# Patient Record
Sex: FEMALE | Race: WHITE | NOT HISPANIC OR LATINO | RURAL
[De-identification: names, ages, dates, MRNs, and addresses within clinical notes are randomized per-mention and may not be internally consistent; named-entity substitution may affect disease eponyms.]

---

## 2022-12-06 ENCOUNTER — WEB ENCOUNTER (OUTPATIENT)
Dept: RURAL CLINIC 4 | Facility: CLINIC | Age: 77
End: 2022-12-06

## 2022-12-06 ENCOUNTER — LAB OUTSIDE AN ENCOUNTER (OUTPATIENT)
Dept: RURAL CLINIC 4 | Facility: CLINIC | Age: 77
End: 2022-12-06

## 2022-12-06 ENCOUNTER — DASHBOARD ENCOUNTERS (OUTPATIENT)
Age: 77
End: 2022-12-06

## 2022-12-06 ENCOUNTER — OFFICE VISIT (OUTPATIENT)
Dept: RURAL CLINIC 4 | Facility: CLINIC | Age: 77
End: 2022-12-06
Payer: MEDICARE

## 2022-12-06 VITALS
SYSTOLIC BLOOD PRESSURE: 123 MMHG | HEIGHT: 61 IN | TEMPERATURE: 96.6 F | BODY MASS INDEX: 28.7 KG/M2 | DIASTOLIC BLOOD PRESSURE: 81 MMHG | HEART RATE: 80 BPM | WEIGHT: 152 LBS

## 2022-12-06 DIAGNOSIS — R10.13 EPIGASTRIC PAIN: ICD-10-CM

## 2022-12-06 DIAGNOSIS — Z86.010 HISTORY OF COLON POLYPS: ICD-10-CM

## 2022-12-06 DIAGNOSIS — R14.0 ABDOMINAL BLOATING: ICD-10-CM

## 2022-12-06 DIAGNOSIS — K21.9 ACID REFLUX: ICD-10-CM

## 2022-12-06 PROBLEM — 312824007: Status: ACTIVE | Noted: 2022-12-06

## 2022-12-06 PROBLEM — 422587007: Status: ACTIVE | Noted: 2022-12-06

## 2022-12-06 PROBLEM — 79922009: Status: ACTIVE | Noted: 2022-12-06

## 2022-12-06 PROBLEM — 116289008: Status: ACTIVE | Noted: 2022-12-06

## 2022-12-06 PROBLEM — 16932000: Status: ACTIVE | Noted: 2022-12-06

## 2022-12-06 PROCEDURE — 99203 OFFICE O/P NEW LOW 30 MIN: CPT | Performed by: NURSE PRACTITIONER

## 2022-12-06 RX ORDER — PANTOPRAZOLE SODIUM 40 MG/1
1 TABLET TABLET, DELAYED RELEASE ORAL TWICE A DAY
Status: ACTIVE | COMMUNITY

## 2022-12-06 RX ORDER — SODIUM, POTASSIUM,MAG SULFATES 17.5-3.13G
177ML SOLUTION, RECONSTITUTED, ORAL ORAL
Qty: 1 | Refills: 0 | OUTPATIENT
Start: 2022-12-06 | End: 2022-12-08

## 2022-12-06 RX ORDER — LEVOTHYROXINE SODIUM 100 UG/1
1 TABLET IN THE MORNING ON AN EMPTY STOMACH TABLET ORAL ONCE A DAY
Status: DISCONTINUED | COMMUNITY

## 2022-12-06 RX ORDER — BUDESONIDE AND FORMOTEROL FUMARATE DIHYDRATE 160; 4.5 UG/1; UG/1
2 PUFFS AEROSOL RESPIRATORY (INHALATION) TWICE A DAY
Status: ACTIVE | COMMUNITY

## 2022-12-06 RX ORDER — OMEPRAZOLE 20 MG/1
1 CAPSULE 30 MINUTES BEFORE MORNING MEAL CAPSULE, DELAYED RELEASE ORAL ONCE A DAY
Status: DISCONTINUED | COMMUNITY

## 2022-12-06 RX ORDER — HYDROCHLOROTHIAZIDE 25 MG/1
1 TABLET IN THE MORNING TABLET ORAL ONCE A DAY
Status: DISCONTINUED | COMMUNITY

## 2022-12-06 RX ORDER — NAPROXEN SODIUM 220 MG/1
1 TABLET WITH FOOD OR MILK AS NEEDED TABLET ORAL
Status: DISCONTINUED | COMMUNITY

## 2022-12-06 RX ORDER — TRICON 15; 110; 75; .5 UG/1; MG/1; MG/1; MG/1
1 CAPSULE WITH MEALS CAPSULE ORAL TWICE A DAY
Status: ACTIVE | COMMUNITY

## 2022-12-06 RX ORDER — TIOTROPIUM BROMIDE INHALATION SPRAY 3.12 UG/1
2 PUFFS SPRAY, METERED RESPIRATORY (INHALATION) ONCE A DAY
Status: ACTIVE | COMMUNITY

## 2022-12-06 RX ORDER — OCTISALATE, AVOBENZONE, HOMOSALATE, AND OCTOCRYLENE 29.4; 29.4; 49; 25.48 MG/ML; MG/ML; MG/ML; MG/ML
AS DIRECTED LOTION TOPICAL
Status: ACTIVE | COMMUNITY

## 2022-12-06 RX ORDER — ALBUTEROL SULFATE 2.5 MG/3ML
3 ML AS NEEDED SOLUTION RESPIRATORY (INHALATION)
Status: ACTIVE | COMMUNITY

## 2022-12-06 RX ORDER — LEVOTHYROXINE SODIUM 100 UG/1
1 TABLET IN THE MORNING ON AN EMPTY STOMACH TABLET ORAL ONCE A DAY
Status: ACTIVE | COMMUNITY

## 2022-12-06 RX ORDER — DULAGLUTIDE 3 MG/.5ML
AS DIRECTED INJECTION, SOLUTION SUBCUTANEOUS
Status: ACTIVE | COMMUNITY

## 2022-12-06 RX ORDER — ALPRAZOLAM 0.5 MG/1
1 TABLET TABLET ORAL TWICE A DAY
Status: ACTIVE | COMMUNITY

## 2022-12-06 RX ORDER — INSULIN GLARGINE 100 [IU]/ML
AS DIRECTED INJECTION, SOLUTION SUBCUTANEOUS
Status: ACTIVE | COMMUNITY

## 2022-12-06 RX ORDER — DULOXETINE 60 MG/1
1 CAPSULE CAPSULE, DELAYED RELEASE PELLETS ORAL ONCE A DAY
Status: ACTIVE | COMMUNITY

## 2022-12-06 RX ORDER — ROPINIROLE HYDROCHLORIDE 0.5 MG/1
1 TABLET TABLET, FILM COATED ORAL THREE TIMES A DAY
Status: DISCONTINUED | COMMUNITY

## 2022-12-06 RX ORDER — FENOFIBRATE 54 MG/1
1 TABLET WITH FOOD TABLET ORAL ONCE A DAY
Status: DISCONTINUED | COMMUNITY

## 2022-12-06 NOTE — HPI-TODAY'S VISIT:
The patient is a 77-year-old female who presents on referral from Radhika MAJOR for the evaluation of a colonoscopy for history of colon polyps and a family history of colon cancer in her father.  A copy of the consultation will be sent to the referring provider.  The patient last underwent a colonoscopy approximately 6 years ago.  She is unsure of her findings.  She is currently asymptomatic and denies abdominal pain, change in bowel pattern, melena and hematochezia. History of GERD and is currently taking pantoprazole 40 mg daily which typically controls her symptoms however over the past several weeks she has been having breakthrough acid reflux as well as nausea with rare vomiting.  She also reports a history of epigastric tenderness with upper abdominal bloating, mild in severity, occurring intermittently.

## 2023-03-10 PROBLEM — 428283002: Status: ACTIVE | Noted: 2022-12-06

## 2023-03-10 PROBLEM — 235595009: Status: ACTIVE | Noted: 2022-12-06

## 2023-04-14 ENCOUNTER — OFFICE VISIT (OUTPATIENT)
Dept: RURAL MEDICAL CENTER 2 | Facility: MEDICAL CENTER | Age: 78
End: 2023-04-14

## 2023-04-17 ENCOUNTER — TELEPHONE ENCOUNTER (OUTPATIENT)
Dept: RURAL CLINIC 4 | Facility: CLINIC | Age: 78
End: 2023-04-17

## 2023-04-18 ENCOUNTER — OFFICE VISIT (OUTPATIENT)
Dept: RURAL MEDICAL CENTER 4 | Facility: MEDICAL CENTER | Age: 78
End: 2023-04-18
Payer: MEDICARE

## 2023-04-18 DIAGNOSIS — D12.0 ADENOMA OF CECUM: ICD-10-CM

## 2023-04-18 DIAGNOSIS — D12.5 ADENOMA OF SIGMOID COLON: ICD-10-CM

## 2023-04-18 DIAGNOSIS — K22.89 DILATATION OF ESOPHAGUS: ICD-10-CM

## 2023-04-18 DIAGNOSIS — Z86.010 ADENOMAS PERSONAL HISTORY OF COLONIC POLYPS: ICD-10-CM

## 2023-04-18 DIAGNOSIS — K21.9 ACID REFLUX: ICD-10-CM

## 2023-04-18 DIAGNOSIS — D12.3 ADENOMA OF TRANSVERSE COLON: ICD-10-CM

## 2023-04-18 DIAGNOSIS — K29.60 ADENOPAPILLOMATOSIS GASTRICA: ICD-10-CM

## 2023-04-18 PROCEDURE — 43239 EGD BIOPSY SINGLE/MULTIPLE: CPT | Performed by: INTERNAL MEDICINE

## 2023-04-18 PROCEDURE — 45385 COLONOSCOPY W/LESION REMOVAL: CPT | Performed by: INTERNAL MEDICINE

## 2023-04-18 RX ORDER — DULAGLUTIDE 3 MG/.5ML
AS DIRECTED INJECTION, SOLUTION SUBCUTANEOUS
Status: ACTIVE | COMMUNITY

## 2023-04-18 RX ORDER — OCTISALATE, AVOBENZONE, HOMOSALATE, AND OCTOCRYLENE 29.4; 29.4; 49; 25.48 MG/ML; MG/ML; MG/ML; MG/ML
AS DIRECTED LOTION TOPICAL
Status: ACTIVE | COMMUNITY

## 2023-04-18 RX ORDER — TRICON 15; 110; 75; .5 UG/1; MG/1; MG/1; MG/1
1 CAPSULE WITH MEALS CAPSULE ORAL TWICE A DAY
Status: ACTIVE | COMMUNITY

## 2023-04-18 RX ORDER — PANTOPRAZOLE SODIUM 40 MG/1
1 TABLET TABLET, DELAYED RELEASE ORAL TWICE A DAY
Status: ACTIVE | COMMUNITY

## 2023-04-18 RX ORDER — TIOTROPIUM BROMIDE INHALATION SPRAY 3.12 UG/1
2 PUFFS SPRAY, METERED RESPIRATORY (INHALATION) ONCE A DAY
Status: ACTIVE | COMMUNITY

## 2023-04-18 RX ORDER — CLOTRIMAZOLE 10 MG/1
1 TROCHE LOZENGE ORAL; TOPICAL THREE TIMES A DAY
Qty: 30 | Refills: 0 | OUTPATIENT
Start: 2023-04-18 | End: 2023-05-18

## 2023-04-18 RX ORDER — LEVOTHYROXINE SODIUM 100 UG/1
1 TABLET IN THE MORNING ON AN EMPTY STOMACH TABLET ORAL ONCE A DAY
Status: ACTIVE | COMMUNITY

## 2023-04-18 RX ORDER — BUDESONIDE AND FORMOTEROL FUMARATE DIHYDRATE 160; 4.5 UG/1; UG/1
2 PUFFS AEROSOL RESPIRATORY (INHALATION) TWICE A DAY
Status: ACTIVE | COMMUNITY

## 2023-04-18 RX ORDER — DULOXETINE 60 MG/1
1 CAPSULE CAPSULE, DELAYED RELEASE PELLETS ORAL ONCE A DAY
Status: ACTIVE | COMMUNITY

## 2023-04-18 RX ORDER — ALPRAZOLAM 0.5 MG/1
1 TABLET TABLET ORAL TWICE A DAY
Status: ACTIVE | COMMUNITY

## 2023-04-18 RX ORDER — INSULIN GLARGINE 100 [IU]/ML
AS DIRECTED INJECTION, SOLUTION SUBCUTANEOUS
Status: ACTIVE | COMMUNITY

## 2023-04-18 RX ORDER — ALBUTEROL SULFATE 2.5 MG/3ML
3 ML AS NEEDED SOLUTION RESPIRATORY (INHALATION)
Status: ACTIVE | COMMUNITY